# Patient Record
Sex: FEMALE | Race: WHITE | Employment: FULL TIME | URBAN - METROPOLITAN AREA
[De-identification: names, ages, dates, MRNs, and addresses within clinical notes are randomized per-mention and may not be internally consistent; named-entity substitution may affect disease eponyms.]

---

## 2018-12-10 RX ORDER — SERTRALINE HYDROCHLORIDE 50 MG/1
50 TABLET, FILM COATED ORAL
COMMUNITY

## 2018-12-10 NOTE — PERIOP NOTES
[facility] Ambulatory Surgery Unit Pre-operative Instructions Surgery/Procedure Date  12/14/18          Tentative Arrival Time 6402 1. On the day of your surgery/procedure, please report to the Ambulatory Surgery Unit Registration Desk and sign in at your designated time. The Ambulatory Surgery Unit is located in [facility] on the [facility] side of the [facility] across from the [address] [place] [address]. Please have all of your health insurance cards and a photo ID. 2. You must have someone with you to drive you home, as you should not drive a car for 24 hours following anesthesia. Please make arrangements for a responsible adult friend or family member to stay with you for at least the first 24 hours after your surgery. 3. Do not have anything to eat or drink (including water, gum, mints, coffee, juice) after 11:59 PM  12/13/18. This may not apply to medications prescribed by your physician. (Please note below the special instructions with medications to take the morning of surgery, if applicable.) 4. We recommend you do not drink any alcoholic beverages for 24 hours before and after your surgery. 5. Contact your surgeons office for instructions on the following medications: non-steroidal anti-inflammatory drugs (i.e. Advil, Aleve), vitamins, and supplements. (Some surgeons will want you to stop these medications prior to surgery and others may allow you to take them) **If you are currently taking Plavix, Coumadin, Aspirin and/or other blood-thinning agents, contact your surgeon for instructions. ** Your surgeon will partner with the physician prescribing these medications to determine if it is safe to stop or if you need to continue taking. Please do not stop taking these medications without instructions from your surgeon.  
 
6. In an effort to help prevent surgical site infection, we ask that you shower with an anti-bacterial soap (i.e. Dial/Safeguard, or the soap provided to you at your preadmission testing appointment) for 3 days prior to and on the morning of surgery, using a fresh towel after each shower. (Please begin this process with fresh bed linens.) Do not apply any lotions, powders, or deodorants after the shower on the day of your procedure. If applicable, please do not shave the operative site for 48 hours prior to surgery. 7. Wear comfortable clothes. Wear glasses instead of contacts. Do not bring any jewelry or money (other than copays or fees as instructed). Do not wear make-up, particularly mascara, the morning of your surgery. Do not wear nail polish, particularly if you are having foot /hand surgery. Wear your hair loose or down, no ponytails, buns, jama pins or clips. All body piercings must be removed. 8. You should understand that if you do not follow these instructions your surgery may be cancelled. If your physical condition changes (i.e. fever, cold or flu) please contact your surgeon as soon as possible. 9. It is important that you be on time. If a situation occurs where you may be late, or if you have any questions or problems, please call [phone]. 
 
10. Your surgery time may be subject to change. You will receive a phone call the day prior to surgery to confirm your arrival time. 11. Pediatric patients: please bring a change of clothes, diapers, bottle/sippy cup, pacifier, etc. 
 
 
Special Instructions: Take all medications and inhalers, as prescribed, on the morning of surgery with a sip of water EXCEPT: metformin Insulin Dependent Diabetic patients: Take your diabetic medications as prescribed the day before surgery. Hold all diabetic medications the day of surgery. If you are scheduled to arrive for surgery after 8:00 AM, and your AM blood sugar is >200, please call Ambulatory Surgery.  
 
 
I understand a pre-operative phone call will be made to verify my surgery time.  In the event that I am not available, I give permission for a message to be left on my answering service and/or with another person? YES The above note was reviewed with patient during PAT phone call on 12/10/18, patient verbalized understanding.  
 
 
 
 
 ___________________      ___________________      ________________ 
(Signature of Patient)          (Witness)                   (Date and Time)

## 2018-12-12 PROBLEM — N95.0 POSTMENOPAUSAL BLEEDING: Status: ACTIVE | Noted: 2018-12-12

## 2018-12-12 NOTE — H&P
Chief Complaint  Pre-Op Exam  Patient's Care Team  OB/GYN: Cheyenne Oates MD: French Pham Do Ever Diaz [address], [address] , [address] [facility], Ph [phone], Fax [phone] NPI: [id number]  Primary Care Provider: Josh Badillo: [address] [facility], [facility] Shakir Bhakta Rylan, Ph [phone], Fax [phone] NPI: [id number]  Patient's Pharmacies  [facility]Y [id number]): [address] [facility] [address], Ph [phone], Fax [phone]  12/12/2018 12:03 pm  Ht: 5 ft 5 in (165.1 cm)  Wt: Refused  BP: 146/90     Allergies  Reviewed Allergies  CIPRO: Hives (Severe) - Reaction: hives; Severity: Severe; Comment: Entered By: Michelle Mercado LPNSigned By: Michelle Mercado LPNType: GPICode: [id number]: N;   LATEX: Rash (Moderate) - Reaction: rash;Severity: Moderate; Comment: Entered By: Adam Goodwin RNSigned By: Chris Williamson MDType: GPICode: [id number]: Y;   SEPTRA: Hives (Severe) - Reaction: hives; Severity: Severe; Comment: Entered By: Michelle PETERSONigned By: [place]: GPICode: [id number]: N;   SULFA (SULFONAMIDE ANTIBIOTICS): Hives     Medications  Reviewed Medications  calcium  11/12/18   entered Jeff Opal Barley  Cytomel 5 mcg tablet  Prescribed by dale 603/164 Syeda Godoy MD  Internal Note: Entered By: Michelle PETERSONigned By: Michelle Mercado LPNUncoded: NBMN: N, start 05/21/2015  05/21/15   filled rshahulhameed. [address]  esomeprazole magnesium 40 mg capsule,delayed release  10/03/18   filled [facility]  fluconazole 150 mg tablet  10/14/18   filled [facility]  fluticasone 50 mcg/actuation nasal spray,suspension  07/13/18   filled [facility]  folic acid  34/82/32   entered Jeff Mendes  levothyroxine 150 mcg tablet  10/19/18   filled [facility]  liothyronine 25 mcg tablet  09/23/18   filled [facility]  lutein  11/12/18   entered Jeff Mendes  Macrodantin 50 mg capsule  Take 1 po q day - take on even numbered months  Internal Note: Entered By: Adam Goodwin RNSigned By: Katey Arenas RNPharmacy Name: [place] [address] Shakir Bhakta Rylan Ph: [phone] Fax: [phone] Date: 74-Cfn-2267Ew ID: [id number] By: Gayatri Hoyos MDUncoded: NBMN: N, start 02/17/2016 02/17/16   filled rshahulhameed. [address]97  magnesium  11/12/18   entered Cindy Simmonds Barley  metFORMIN 500 mg tablet  10/03/18   filled [facility]  methenamine hippurate 1 gram tablet  12/04/17   filled [facility]  metroNIDAZOLE 0.75 % vaginal gel  03/03/18   filled [facility]  montelukast 10 mg tablet  10/03/18   filled [facility]  MoviPrep 100 gram-7.5 gram-2.691 gram oral powder packet  10/16/18   filled [facility]  Myrbetriq 50 mg tablet,extended release  Take 1 every day  Internal Note: Entered By: Gayatri Hoyos MDSigned By: [address] [place] [address] Name: [place] [address] Shakir Bhakta Rylan Ph: [phone] Fax: [phone] Date: 09-Tbh-2560Xd ID: [id number] By: Gayatri Hoyos MDUncoded: NBMN: N, start 09/17/2015  09/17/15   filled rshahulhameed. [address]87[address]  nitrofurantoin monohydrate/macrocrystals 100 mg capsule  10/14/18   filled [facility]  Pravachol 20 mg tablet  Prescribed by non [id number] Syeda Godoy MD  Internal Note: Entered By: Ivy Molina LPNSigned By: Ivy Molina LPNUncoded: NBMN: N, start 05/21/2015  05/21/15   filled rshahulhameed. [address]294  pravastatin 40 mg tablet  10/03/18   filled [facility]  Premarin 0.625 mg/gram vaginal cream  07/19/18   filled [facility]  sertraline 50 mg tablet  Prescribed by non 607/958 Syeda Godoy MD  10/03/18   filled [facility]  traZODone 50 mg tablet  03/19/18   filled [facility]  trimethoprim 100 mg tablet  Take 1 po q day on odd numbered months.   Internal Note: Entered By: Katey Arenas RNSigned By: Katey Arenas RNPharmacy Name: [address] Yony Fagan Ph: [phone] Fax: [phone] Date: [id number]-Lwf-8582Zy ID: [id number] By: Maria Isabel Walker MDUncoded: NBMN: N, start 02/17/2016 02/17/16   filled rshastan. [address]7022  Vitamin B12  11/12/18   entered Mia Mendes    Problems  Reviewed Problems  Atypical squamous cells of undetermined significance on cervical Papanicolaou smear - Onset: 11/14/2018 - Neg HPV repeat pap 2021  History of urinary tract infection - Onset: 02/11/2016 - Entered By: Dimitri Grande By: Maria Isabel Walker MD Description: Personal Hx UTI code: V13.02  Bladder muscle dysfunction - overactive - Onset: 05/21/2015 - Entered By: Maria Isabel Walker MDSigned By: Maria Isabel Walker MD Description: Overactive Bladder code: 854.40  History of urinary disease - Onset: 09/17/2015 - Entered By: Maria Isabel Walker MDSigned By: Maria Isabel Walker MD Description: Personal Hx stress incontinence code: V13.09  Family history of malignant neoplasm of breast - Onset: 05/21/2015 - Entered By: Ernestine Stafford LPNSigned By: Ernestine Stafford LPN Description: Family History of Breast Cancer code: V16.3  Urge incontinence of urine - Onset: 05/21/2015 - Entered By: Maria Isabel Walker MDSigned By: Maria Isabel Walker MD Description: Incontinence-Urge code: 788.31    Hysteroscopy, D&C, Poss Myosure Resection [id number] 10:25am Parksingel 45, PAT per anesthesia    Family History  Reviewed Family History  Mother - Malignant tumor of breast    - Hypertensive disorder  Father - Heart disease  Sister - Malignant tumor of breast  Maternal Grandmother - Malignant tumor of breast  [facility] En[facility] Bernardo.): Has Family History of Heart DiseaseMother (Monae Ramsay.): Has Family History of Breast Cancer, Has Family History of HypertensionSister (full): Has Family History of Breast Cancer    Social History  Reviewed Social History    OB/GYN Social History    Smoking Status: Former smoker (Notes: Quit 40 years ago)  Marital status: Single  Number of children: 0  Are you working: Yes  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 Date of HPV testin2018 (Notes: negative). Abnormal Pap: N. Sexually Active?: N.  STIs/STDs: N.  Current Birth Control Method: None. Endometriosis: N. Fibroids: N. Infertility: N. Ovarian Cyst: Y. PCOS: N.  Obstetric History  Reviewed Obstetric History  TOTAL FULL PRE AB. I AB. S ECTOPICS MULTIPLE LIVING  0           Past Medical History  Reviewed Past Medical History  Gastroesophageal Reflux Disease (GERD): Y  Notes: Bladder Infections Diabetes Hepatitis B Weight Disorder, Hyperlipidemia, chronic UTI, Autoimmune Thyroiditis - Hypothyroid  HPI  59year old with episode of PMB    Seen in [address] [place] [address] noted for nabothian cyst of cervix and [id number] area in fundal region c/w polyp    Cervical stenosis precluded endometrial biopsy    Now admitted for hysteroscopy D&C possible myosure resection. ROS  ROS as noted in the HPI  Physical Exam  Patient is a 22-year-old female. Constitutional: General Appearance: well developed and nourished and pleasant. Level of Distress: no acute distress. Ambulation: ambulating normally. Head: Head: normocephalic. Eyes: Extraocular Movements extraocular movements intact. Ears, Nose, Mouth, Throat: Ears normal hearing. Nose: no external nose lesions.     Neck: Appearance supple, trachea midline, and no neck masses. Thyroid: no enlargement or nodules and non-tender. Lungs / Chest: Respiratory effort: unlabored. Inspection / Palpation: no deformity, tenderness, or swelling. Auscultation: no wheezing or rales/crackles. Cardiovascular: Rate And Rhythm: regular. Heart Sounds: no murmurs, rub, or gallops. Extremities: no clubbing, cyanosis, or edema. Breast: Breasts no masses, tenderness, skin changes, abnormal secretions, or axillary lymphadenopathy and symmetric and normal nipple appearance. Right Breast no right breast mass, erythema, or induration. Left Breast no left breast mass, erythema, or induration. Abdomen: Inspection and Palpation: no tenderness, guarding, masses, rebound tenderness, or CVA tenderness and soft and non-distended. Liver: non-tender; no masses. Spleen: no masses. Hernia: none palpable. Female : Chaperone: present. External genitalia: no lesions, rash, or erythema. Vagina: no discharge, mass, or tenderness. Cervix: no discharge or cervical lesion; nabothian cysts noted. Uterus: midline, non-tender, no mass, and not enlarged. Adnexae: no adnexal mass or tenderness. Bladder and Urethra: no urethral discharge or mass. Lymphatics: Inguinal no inguinal lymphadenopathy. Skin: General Skin no rash or suspicious lesions. Neurologic: Gait and Station: normal gait. Sensation: grossly intact. Motor: grossly intact. Mental Status Exam: Orientation oriented to person, place, and time. Assessment / Plan  1. Postmenopausal bleeding -  Cervical stenosis precluded in office biopsy    US possible polyp    Will post for out patient hysteroscopy D&C possible myosure    All questions were answered and she appeared to understand    N95.0: Postmenopausal bleeding    2.  Atypical squamous cells of undetermined significance on cervical Papanicolaou smear -  Negative HPV    R87.610: Atypical squamous cells of undetermined significance on cytologic smear of cervix (ASC-US)  Date of Surgery Update:  Alex Handy was seen and examined. History and physical has been reviewed. The patient has been examined.  There have been no significant clinical changes since the completion of the originally dated History and Physical.    Signed By: Matthew Gomez MD     December 14, 2018 9:11 AM

## 2018-12-13 ENCOUNTER — ANESTHESIA EVENT (OUTPATIENT)
Dept: SURGERY | Age: 58
End: 2018-12-13
Payer: OTHER GOVERNMENT

## 2018-12-14 ENCOUNTER — HOSPITAL ENCOUNTER (OUTPATIENT)
Age: 58
Setting detail: OUTPATIENT SURGERY
Discharge: HOME | End: 2018-12-14
Attending: OBSTETRICS & GYNECOLOGY | Admitting: OBSTETRICS & GYNECOLOGY
Payer: OTHER GOVERNMENT

## 2018-12-14 ENCOUNTER — ANESTHESIA (OUTPATIENT)
Dept: SURGERY | Age: 58
End: 2018-12-14
Payer: OTHER GOVERNMENT

## 2018-12-14 VITALS
SYSTOLIC BLOOD PRESSURE: 141 MMHG | HEART RATE: 69 BPM | BODY MASS INDEX: 37.51 KG/M2 | RESPIRATION RATE: 18 BRPM | HEIGHT: 67 IN | WEIGHT: 239 LBS | DIASTOLIC BLOOD PRESSURE: 80 MMHG | TEMPERATURE: 97.8 F | OXYGEN SATURATION: 100 %

## 2018-12-14 PROCEDURE — 76210000040 HC AMBSU PH I REC FIRST 0.5 HR: Performed by: OBSTETRICS & GYNECOLOGY

## 2018-12-14 PROCEDURE — 77030018836 HC SOL IRR NACL ICUM -A: Performed by: OBSTETRICS & GYNECOLOGY

## 2018-12-14 PROCEDURE — 77030003666 HC NDL SPINAL BD -A: Performed by: OBSTETRICS & GYNECOLOGY

## 2018-12-14 PROCEDURE — 77030033137 HC TBNG OUTFLO AQUILEX ST HOLO -B: Performed by: OBSTETRICS & GYNECOLOGY

## 2018-12-14 PROCEDURE — 74011250636 HC RX REV CODE- 250/636

## 2018-12-14 PROCEDURE — 88305 TISSUE EXAM BY PATHOLOGIST: CPT

## 2018-12-14 PROCEDURE — 74011250636 HC RX REV CODE- 250/636: Performed by: ANESTHESIOLOGY

## 2018-12-14 PROCEDURE — 77030033135 HC DEV TISS RMVL HYSTSCP MYOSUR HOLO -G1: Performed by: OBSTETRICS & GYNECOLOGY

## 2018-12-14 PROCEDURE — 77030033136 HC TBNG INFLO AQUILEX ST HOLO -C: Performed by: OBSTETRICS & GYNECOLOGY

## 2018-12-14 PROCEDURE — 76210000057 HC AMBSU PH II REC 1 TO 1.5 HR: Performed by: OBSTETRICS & GYNECOLOGY

## 2018-12-14 PROCEDURE — 74011250636 HC RX REV CODE- 250/636: Performed by: OBSTETRICS & GYNECOLOGY

## 2018-12-14 PROCEDURE — 74011250637 HC RX REV CODE- 250/637: Performed by: ANESTHESIOLOGY

## 2018-12-14 PROCEDURE — 77030020255 HC SOL INJ LR 1000ML BG: Performed by: OBSTETRICS & GYNECOLOGY

## 2018-12-14 PROCEDURE — 77030012961 HC IRR KT CYSTO/TUR ICUM -A: Performed by: OBSTETRICS & GYNECOLOGY

## 2018-12-14 PROCEDURE — 76030000000 HC AMB SURG OR TIME 0.5 TO 1: Performed by: OBSTETRICS & GYNECOLOGY

## 2018-12-14 PROCEDURE — 76060000061 HC AMB SURG ANES 0.5 TO 1 HR: Performed by: OBSTETRICS & GYNECOLOGY

## 2018-12-14 RX ORDER — SODIUM CHLORIDE 0.9 % (FLUSH) 0.9 %
5-10 SYRINGE (ML) INJECTION AS NEEDED
Status: DISCONTINUED | OUTPATIENT
Start: 2018-12-14 | End: 2018-12-14 | Stop reason: HOSPADM

## 2018-12-14 RX ORDER — MORPHINE SULFATE 10 MG/ML
2 INJECTION, SOLUTION INTRAMUSCULAR; INTRAVENOUS
Status: DISCONTINUED | OUTPATIENT
Start: 2018-12-14 | End: 2018-12-14 | Stop reason: HOSPADM

## 2018-12-14 RX ORDER — LIDOCAINE HYDROCHLORIDE 20 MG/ML
INJECTION, SOLUTION EPIDURAL; INFILTRATION; INTRACAUDAL; PERINEURAL AS NEEDED
Status: DISCONTINUED | OUTPATIENT
Start: 2018-12-14 | End: 2018-12-14 | Stop reason: HOSPADM

## 2018-12-14 RX ORDER — SCOLOPAMINE TRANSDERMAL SYSTEM 1 MG/1
1 PATCH, EXTENDED RELEASE TRANSDERMAL ONCE
Status: DISCONTINUED | OUTPATIENT
Start: 2018-12-14 | End: 2018-12-14 | Stop reason: HOSPADM

## 2018-12-14 RX ORDER — LIDOCAINE HYDROCHLORIDE 10 MG/ML
0.1 INJECTION, SOLUTION EPIDURAL; INFILTRATION; INTRACAUDAL; PERINEURAL AS NEEDED
Status: DISCONTINUED | OUTPATIENT
Start: 2018-12-14 | End: 2018-12-14 | Stop reason: HOSPADM

## 2018-12-14 RX ORDER — SODIUM CHLORIDE, SODIUM LACTATE, POTASSIUM CHLORIDE, CALCIUM CHLORIDE 600; 310; 30; 20 MG/100ML; MG/100ML; MG/100ML; MG/100ML
25 INJECTION, SOLUTION INTRAVENOUS CONTINUOUS
Status: DISCONTINUED | OUTPATIENT
Start: 2018-12-14 | End: 2018-12-14 | Stop reason: HOSPADM

## 2018-12-14 RX ORDER — ACETAMINOPHEN 10 MG/ML
INJECTION, SOLUTION INTRAVENOUS
Status: COMPLETED
Start: 2018-12-14 | End: 2018-12-14

## 2018-12-14 RX ORDER — SODIUM CHLORIDE 0.9 % (FLUSH) 0.9 %
5-10 SYRINGE (ML) INJECTION EVERY 8 HOURS
Status: DISCONTINUED | OUTPATIENT
Start: 2018-12-14 | End: 2018-12-14 | Stop reason: HOSPADM

## 2018-12-14 RX ORDER — SODIUM CHLORIDE 9 MG/ML
INJECTION, SOLUTION INTRAVENOUS
Status: COMPLETED | OUTPATIENT
Start: 2018-12-14 | End: 2018-12-14

## 2018-12-14 RX ORDER — OXYCODONE AND ACETAMINOPHEN 5; 325 MG/1; MG/1
1 TABLET ORAL
Status: DISCONTINUED | OUTPATIENT
Start: 2018-12-14 | End: 2018-12-14 | Stop reason: HOSPADM

## 2018-12-14 RX ORDER — FENTANYL CITRATE 50 UG/ML
INJECTION, SOLUTION INTRAMUSCULAR; INTRAVENOUS AS NEEDED
Status: DISCONTINUED | OUTPATIENT
Start: 2018-12-14 | End: 2018-12-14 | Stop reason: HOSPADM

## 2018-12-14 RX ORDER — PROPOFOL 10 MG/ML
INJECTION, EMULSION INTRAVENOUS
Status: DISCONTINUED | OUTPATIENT
Start: 2018-12-14 | End: 2018-12-14 | Stop reason: HOSPADM

## 2018-12-14 RX ORDER — PROPOFOL 10 MG/ML
INJECTION, EMULSION INTRAVENOUS AS NEEDED
Status: DISCONTINUED | OUTPATIENT
Start: 2018-12-14 | End: 2018-12-14 | Stop reason: HOSPADM

## 2018-12-14 RX ORDER — FENTANYL CITRATE 50 UG/ML
25 INJECTION, SOLUTION INTRAMUSCULAR; INTRAVENOUS
Status: DISCONTINUED | OUTPATIENT
Start: 2018-12-14 | End: 2018-12-14 | Stop reason: HOSPADM

## 2018-12-14 RX ORDER — ACETAMINOPHEN 10 MG/ML
1000 INJECTION, SOLUTION INTRAVENOUS ONCE
Status: COMPLETED | OUTPATIENT
Start: 2018-12-14 | End: 2018-12-14

## 2018-12-14 RX ORDER — LIDOCAINE HYDROCHLORIDE 10 MG/ML
INJECTION INFILTRATION; PERINEURAL AS NEEDED
Status: DISCONTINUED | OUTPATIENT
Start: 2018-12-14 | End: 2018-12-14 | Stop reason: HOSPADM

## 2018-12-14 RX ORDER — KETOROLAC TROMETHAMINE 30 MG/ML
INJECTION, SOLUTION INTRAMUSCULAR; INTRAVENOUS AS NEEDED
Status: DISCONTINUED | OUTPATIENT
Start: 2018-12-14 | End: 2018-12-14 | Stop reason: HOSPADM

## 2018-12-14 RX ORDER — DEXAMETHASONE SODIUM PHOSPHATE 4 MG/ML
INJECTION, SOLUTION INTRA-ARTICULAR; INTRALESIONAL; INTRAMUSCULAR; INTRAVENOUS; SOFT TISSUE AS NEEDED
Status: DISCONTINUED | OUTPATIENT
Start: 2018-12-14 | End: 2018-12-14 | Stop reason: HOSPADM

## 2018-12-14 RX ORDER — ONDANSETRON 2 MG/ML
4 INJECTION INTRAMUSCULAR; INTRAVENOUS AS NEEDED
Status: DISCONTINUED | OUTPATIENT
Start: 2018-12-14 | End: 2018-12-14 | Stop reason: HOSPADM

## 2018-12-14 RX ORDER — MIDAZOLAM HYDROCHLORIDE 1 MG/ML
INJECTION, SOLUTION INTRAMUSCULAR; INTRAVENOUS AS NEEDED
Status: DISCONTINUED | OUTPATIENT
Start: 2018-12-14 | End: 2018-12-14 | Stop reason: HOSPADM

## 2018-12-14 RX ORDER — ONDANSETRON 2 MG/ML
INJECTION INTRAMUSCULAR; INTRAVENOUS AS NEEDED
Status: DISCONTINUED | OUTPATIENT
Start: 2018-12-14 | End: 2018-12-14 | Stop reason: HOSPADM

## 2018-12-14 RX ORDER — DIPHENHYDRAMINE HYDROCHLORIDE 50 MG/ML
12.5 INJECTION, SOLUTION INTRAMUSCULAR; INTRAVENOUS AS NEEDED
Status: DISCONTINUED | OUTPATIENT
Start: 2018-12-14 | End: 2018-12-14 | Stop reason: HOSPADM

## 2018-12-14 RX ORDER — HYDROMORPHONE HYDROCHLORIDE 1 MG/ML
.2-.5 INJECTION, SOLUTION INTRAMUSCULAR; INTRAVENOUS; SUBCUTANEOUS ONCE
Status: DISCONTINUED | OUTPATIENT
Start: 2018-12-14 | End: 2018-12-14 | Stop reason: HOSPADM

## 2018-12-14 RX ADMIN — SODIUM CHLORIDE, SODIUM LACTATE, POTASSIUM CHLORIDE, AND CALCIUM CHLORIDE 25 ML/HR: 600; 310; 30; 20 INJECTION, SOLUTION INTRAVENOUS at 09:02

## 2018-12-14 RX ADMIN — ONDANSETRON 4 MG: 2 INJECTION INTRAMUSCULAR; INTRAVENOUS at 09:56

## 2018-12-14 RX ADMIN — DEXAMETHASONE SODIUM PHOSPHATE 4 MG: 4 INJECTION, SOLUTION INTRA-ARTICULAR; INTRALESIONAL; INTRAMUSCULAR; INTRAVENOUS; SOFT TISSUE at 09:56

## 2018-12-14 RX ADMIN — ACETAMINOPHEN 1000 MG: 10 INJECTION, SOLUTION INTRAVENOUS at 10:42

## 2018-12-14 RX ADMIN — PROPOFOL 20 MG: 10 INJECTION, EMULSION INTRAVENOUS at 10:01

## 2018-12-14 RX ADMIN — KETOROLAC TROMETHAMINE 30 MG: 30 INJECTION, SOLUTION INTRAMUSCULAR; INTRAVENOUS at 09:56

## 2018-12-14 RX ADMIN — PROPOFOL 20 MG: 10 INJECTION, EMULSION INTRAVENOUS at 09:55

## 2018-12-14 RX ADMIN — FENTANYL CITRATE 50 MCG: 50 INJECTION, SOLUTION INTRAMUSCULAR; INTRAVENOUS at 09:50

## 2018-12-14 RX ADMIN — PROPOFOL 80 MG: 10 INJECTION, EMULSION INTRAVENOUS at 09:50

## 2018-12-14 RX ADMIN — MIDAZOLAM HYDROCHLORIDE 2 MG: 1 INJECTION, SOLUTION INTRAMUSCULAR; INTRAVENOUS at 09:42

## 2018-12-14 RX ADMIN — PROPOFOL 20 MG: 10 INJECTION, EMULSION INTRAVENOUS at 10:09

## 2018-12-14 RX ADMIN — LIDOCAINE HYDROCHLORIDE 50 MG: 20 INJECTION, SOLUTION EPIDURAL; INFILTRATION; INTRACAUDAL; PERINEURAL at 09:50

## 2018-12-14 RX ADMIN — PROPOFOL 75 MCG/KG/MIN: 10 INJECTION, EMULSION INTRAVENOUS at 09:50

## 2018-12-14 NOTE — BRIEF OP NOTE
BRIEF OPERATIVE NOTE    Date of Procedure: 12/14/2018   Preoperative Diagnosis: POST MENOPAUSAL BLEEDING  Postoperative Diagnosis: Endometrial polyp    Procedure(s):   HYSTEROSCOPY DLATATION AND CURETTAGE,  MYOSURE RESECTION  Surgeon(s) and Role:     * Nan Combs MD - Primary         Surgical Assistant: None    Surgical Staff:  Circ-1: Janey Hinton  Scrub Tech-1: Kilpatrick LiveClips  Event Time In Time Out   Incision Start 1000    Incision Close 1014      Anesthesia: MAC and paracervical block  Estimated Blood Loss: Minimal  Specimens:   ID Type Source Tests Collected by Time Destination   1 : Endometrial curettings Preservative Endometrial Curetting  Nan Combs MD 12/14/2018 1003 Pathology      Findings: 2 cm polyp filling endometrial cavity, no other pathology noted  Complications: None  Fluid deficit 140cc   Implants: * No implants in log *   Dictated:[id number]

## 2018-12-14 NOTE — PERIOP NOTES
Yael Maine  1960  [id number]    Situation:  Verbal report given from: MAMADOU Lyons  Procedure: Procedure(s):   HYSTEROSCOPY DLATATION AND CURETTAGE,  MYOSURE RESECTION    Background:    Preoperative diagnosis: POST MENOPAUSAL BLEEDING    Postoperative diagnosis: Endometrial polyp    :  Dr. Zohreh Jeffrey    Assistant(s): Circ-1: Adiel Doty Tech-1: Gerald Santana    Specimens:   ID Type Source Tests Collected by Time Destination   1 : Endometrial curettings Preservative Endometrial Curetting  David He MD 12/14/2018 1003 Pathology       Assessment:  Intra-procedure medications         Anesthesia gave intra-procedure sedation and medications, see anesthesia flow sheet     Intravenous fluids: LR@ KVO     Vital signs stable       Recommendation:    Permission to share finding with sister : yes

## 2018-12-14 NOTE — ANESTHESIA PREPROCEDURE EVALUATION
Anesthetic History     PONV ( motion sickness)          Review of Systems / Medical History  Patient summary reviewed, nursing notes reviewed and pertinent labs reviewed    Pulmonary  Within defined limits                 Neuro/Psych         Psychiatric history (depression)     Cardiovascular  Within defined limits                Exercise tolerance: >4 METS     GI/Hepatic/Renal     GERD: well controlled  Hepatitis (1980s): type C         Endo/Other      Hypothyroidism: well controlled       Other Findings            Physical Exam    Airway  Mallampati: I  TM Distance: 4 - 6 cm  Neck ROM: normal range of motion   Mouth opening: Normal     Cardiovascular    Rhythm: regular  Rate: normal         Dental  No notable dental hx       Pulmonary  Breath sounds clear to auscultation               Abdominal  GI exam deferred       Other Findings            Anesthetic Plan    ASA: 2  Anesthesia type: MAC and general - backup          Induction: Intravenous  Anesthetic plan and risks discussed with: Patient      Scopolamine patch for h/o motion sickness

## 2018-12-14 NOTE — ANESTHESIA POSTPROCEDURE EVALUATION
Procedure(s): HYSTEROSCOPY DLATATION AND CURETTAGE,  MYOSURE RESECTION.     Anesthesia Post Evaluation      Multimodal analgesia: multimodal analgesia used between 6 hours prior to anesthesia start to PACU discharge  Patient location during evaluation: bedside  Patient participation: complete - patient participated  Level of consciousness: awake and alert  Pain score: 3  Airway patency: patent  Anesthetic complications: no  Cardiovascular status: acceptable  Respiratory status: acceptable  Hydration status: acceptable  Post anesthesia nausea and vomiting:  none      Visit Vitals  /80   Pulse 69   Temp 36.6 °C (97.8 °F)   Resp 18   Ht 5' 7\" (1.702 m)   Wt 108.4 kg (239 lb)   SpO2 100%   BMI 37.43 kg/m²

## 2018-12-14 NOTE — OP NOTES
[address]  OPERATIVE REPORT    Erica Olmedo  MR#: [id number]  : 1960  ACCOUNT #: [de-identified]   DATE OF SERVICE: 2018    PREOPERATIVE DIAGNOSIS:  Postmenopausal bleeding, suspicious for endometrial polyp. POSTOPERATIVE DIAGNOSIS:  Postmenopausal bleeding with endometrial polyp. PROCEDURES PERFORMED:  Hysteroscopy, dilatation and curettage, MyoSure resection of polyp. SURGEON:  Carey Morelos MD    ASSISTANT:  None. ANESTHESIA:  MAC along with paracervical block using 20 mL of 1% lidocaine. ESTIMATED BLOOD LOSS:  Minimal.    SPECIMENS REMOVED:  Included endometrial polyp and scant endometrial curettings. COMPLICATIONS:  None. IMPLANTS:  None. FINDINGS:  At time of hysteroscopy, there was noted to be a moderate amount of cervical stenosis. On entrance into the uterine cavity there was a polyp approximately 2 cm that filled most of the cavity. This polyp was taken down to its base. Once it was removed both tubal ostia were clearly seen. There is no other endocervical or endometrial pathology. DESCRIPTION OF PROCEDURE:  Once all permits were obtained and proper patient identification, the patient was taken to the operating room THE [facility] Amb[facility] Surgery. She underwent MAC anesthesia, prepped and draped in the usual sterile manner for hysteroscopy, D and C. Once all prepping and draping was done a timeout was performed identifying the patient, indication for surgery, no immediate need for antibiotics or beta blockers. Following this, a red rubber catheter was used to drain the bladder of approximately 50 mL of urine. A side opening speculum was placed. A paracervical block was performed and 20 mL of 1% lidocaine. Anterior lip of the cervix was grasped with single tooth tenaculum. A moderate amount of cervical stenosis was encountered. Cervical canal was dilated.   Once it was dilated to the appropriate width the MyoSure hysteroscope was placed. The entrance into the endometrial cavity was noted to be anterior from where the cervical dilatation had occurred. Once we were able to navigate into the cavity the endometrial polyp was seen. Once the polyp was identified the MyoSure system was placed and under direct hysteroscopic evaluation the polyp was resected and sent off the field to pathology. A scant amount of endometrial curettings were then obtained at the end and collected and sent off the field also. At the conclusion of the operation there was no evidence of bleeding from the uterus or single tooth tenaculum site. Estimated fluid deficit for the MyoSure resection was approximately 150 mL. The patient was taken directly from the operating room to the recovery room in stable condition, observed to be stable and discharged home.       MD LILI Peters / Keyshawn Delong  D: 12/14/2018 10:23     T: 12/14/2018 15:29  JOB #: [id number]

## 2018-12-14 NOTE — PERIOP NOTES
1024-Pt received to pacu. Pt is awake, alert, talkative. VSS on room air. Genny pad is dry. 1029-Dr Chirinos at bedside with sister. 1035-HOB elevated and pt is sipoing water. C/o cramping abd pain, 5/10, doesn't want narcotics. 1042-After consulting with Dr Marly Wing, admin Ofimerv 100mg IV. 1050-Pt resting quietly, and states cramping is easing up. Discharge instructions reviewed. They voice no questions or concerns. 1115-Pt is awake and alert. VSS. Pt states abd cramping is tolerable. Patient meets discharge criteria and agrees she is ready to go home, sister  also agrees. PIV removed. Pt wanted to dress self. Glasses on & hearing aids in.  1136-Pt discharged home in stable condition via w/c to car. Instructed to get up with assistance today.

## 2018-12-14 NOTE — DISCHARGE INSTRUCTIONS
After Care Instructions For Your D&C      1. You may resume your usual diet once the nausea resolves. Initially, try sips of warm fluids and a bland diet. 2. Avoid heavy lifting and straining. Gradually increase your activity. First, try walking and doing light activity around the house. Resume your normal habits if no significant discomfort or bleeding develops. Most women can return to work within one to four days after this procedure. 3. You may take showers. Avoid using a tub bath, swimming pool or hot tub until after your check-up. 4. Do not place anything in your vagina until after your postoperative visit. Do not   douche, use tampons, or have intercourse because this may cause bleeding and   infection. 5. You may initially experience a heavy bloody discharge. This should not be more than your menstrual flow. Over the next several days, the flow should steadily decrease. 6. Typically following the procedure, there is little or no pain. You may feel cramps in your lower abdomen. Tylenol may relieve mild cramping. If pain medication does not improve your symptoms, you should contact your physician. 7. Contact the office if you have excessive bleeding (saturating a pad an hour for two hours or passing large clots). It is also necessary to speak with your physician if you develop chills, a temperature greater than 100.4, difficulty voiding or burning on urination. 8. Your physician may want to see you in the office after your D&C. Please call for an appointment if this has not already been arranged. Our office phone number is [phone]. If appropriate, the microscopic results from your procedure will be discussed at this follow-up visit.            >>>You received Toradol during your surgery.  You may not take any form of NSAIDS (non steroidal anti inflammatory drugs) such as Advil, Ibuprofen, Aleve, Motrin until 4:00pm.<<<      >>>You received an IV form of Tylenol 1000mg during your surgery, you may take tylenol (or pain medication containing Tylenol or Acetaminophen) in 6 hours at 4:445pm.<<<            DO NOT TAKE SLEEPING MEDICATIONS OR ANTIANXIETY MEDICATIONS WHILE TAKING NARCOTIC PAIN MEDICATIONS,  ESPECIALLY THE NIGHT OF ANESTHESIA. CPAP PATIENTS BE SURE TO WEAR MACHINE WHENEVER NAPPING OR SLEEPING. SCOPOLAMINE TRANSDERMAL PATCH      Scopolamine (Absorbed through the skin)  Scopolamine ([id number])    Prevents nausea and vomiting caused by motion sickness or anesthesia and surgery in adults. Remove patch in 24 hours. Brand Name(s):Transderm Scop  There may be other brand names for this medicine. When This Medicine Should Not Be Used: You should not use this medicine if you have had an allergic reaction to scopolamine, or if you have narrow angle glaucoma. After you take off the patch, wash the place where the patch was and your hands thoroughly. How to Dispose of This Medicine: Fold the used patch in half with the sticky sides together. Throw any used patch away so that children or pets cannot get to it. Keep all medicine away from children and never share your medicine with anyone. Drugs and Foods to Avoid:  Ask your doctor or pharmacist before using any other medicine, including over-the-counter medicines, vitamins, and herbal products. Tell your doctor if you are using any medicines that make you sleepy. These include sleeping pills, cold and allergy medicine, narcotic pain relievers, and sedatives. Do not drink alcohol while you are using this medicine. Warnings While Using This Medicine:  Make sure your doctor knows if you are pregnant or breastfeeding, or if you have glaucoma, prostate problems, trouble urinating, blocked bowels, liver disease, kidney disease, or a history of seizures or mental illness. This medicine can cause blurring of vision and other vision problems if it comes in contact with the eyes.  This medicine may also cause problems with urination. If any of these reactions occur, remove the patch and call your doctor right away. This medicine may make you dizzy or drowsy. Avoid driving, using machines, or doing anything else that could be dangerous if you are not alert. If you plan to participate in underwater sports, this medicine may cause disorienting effects. If this is a concern for you, talk with your doctor. Possible Side Effects While Using This Medicine:  Call your doctor right away if you notice any of these side effects: Allergic reaction: Itching or hives, swelling in your face or hands, swelling or tingling in your mouth or throat, chest tightness, trouble breathing. Blurred vision. Confusion or memory loss. Fast, slow, or uneven heartbeat. Lightheadedness, dizziness, drowsiness, or fainting. Seeing, hearing, or feeling things that are not there. Severe eye pain. Trouble urinating. If you notice these less serious side effects, talk with your doctor:  Dry mouth. Dry, itchy, or red eyes. Restlessness. Skin rash or redness. If you notice other side effects that you think are caused by this medicine, tell your doctor. Call your doctor for medical advice about side effects. You may report side effects to FDA at 1-800-FDA-1088  © 3422-5947 [employer] All rights reserved. Scopolamine (Transdermal) (Patch, Extended Release) - DrugNote, English  Generated on Thursday, September 15, 2011 1:38:05 PM     DISCHARGE SUMMARY from Nurse    The following personal items collected during your admission are returned to you:   Dental Appliance: Dental Appliances: None  Vision: Visual Aid: Glasses(sister) PT HAS  Hearing Aid:    Jewelry: Jewelry: None  Clothing: Clothing: With patient  Other Valuables:  Other Valuables: Eyeglasses(hearing aids, eyeglasses with sister) PT HAS  Valuables sent to safe:        PATIENT INSTRUCTIONS:    After General Anesthesia or Intravenous Sedation, for 24 hours or while taking prescription Narcotics:        Someone should be with you for the next 24 hours. For your own safety, a responsible adult must drive you home. · Limit your activities  · Recommended activity: Rest today, up with assistance today. Do not climb stairs or shower unattended for the next 24 hours. · Please start with a soft bland diet and advance as tolerated (no nausea) to regular diet. · If you have a sore throat you should try the following: fluids, warm salt water gargles, or throat lozenges. If it does not improve after several days please follow up with your primary physician. · Do not drive and operate hazardous machinery  · Do not make important personal or business decisions  · Do  not drink alcoholic beverages  · If you have not urinated within 8 hours after discharge, please contact your surgeon on call. Report the following to your surgeon:  · Excessive pain, swelling, redness or odor of or around the surgical area  · Temperature over 100.5  · Nausea and vomiting lasting longer than 4 hours or if unable to take medications  · Any signs of decreased circulation or nerve impairment to extremity: change in color, persistent  numbness, tingling, coldness or increase pain      · You will receive a Post Operative Call from one of the Recovery Room Nurses on the day after your surgery to check on you. It is very important for us to know how you are recovering after your surgery. If you have an issue or need to speak with someone, please call your surgeon, do not wait for the post operative call. · You may receive an e-mail or letter in the mail from [facility] regarding your experience with us in the Ambulatory Surgery Unit. Your feedback is valuable to us and we appreciate your participation in the survey. · If the above instructions are not adequate, please contact Randolph Lorenzo RN, [facility] anesthesia Nurse Manager or our Anesthesiologist, at [phone].   If you are having problems after your surgery, call the physician at his office number. · We wish you a speedy recovery ? What to do at Home:      *  Please give a list of your current medications to your Primary Care Provider. *  Please update this list whenever your medications are discontinued, doses are      changed, or new medications (including over-the-counter products) are added. *  Please carry medication information at all times in case of emergency situations. These are general instructions for a healthy lifestyle:    No smoking/ No tobacco products/ Avoid exposure to second hand smoke    Surgeon General's Warning:  Quitting smoking now greatly reduces serious risk to your health. Obesity, smoking, and sedentary lifestyle greatly increases your risk for illness    A healthy diet, regular physical exercise & weight monitoring are important for maintaining a healthy lifestyle    You may be retaining fluid if you have a history of heart failure or if you experience any of the following symptoms:  Weight gain of 3 pounds or more overnight or 5 pounds in a week, increased swelling in our hands or feet or shortness of breath while lying flat in bed. Please call your doctor as soon as you notice any of these symptoms; do not wait until your next office visit. Recognize signs and symptoms of STROKE:    B - Balance  E - Eyes    F-  Face looks uneven    A-  Arms unable to move or move even    S-  Speech slurred or non-existent    T-  Time-call 911 as soon as signs and symptoms begin-DO NOT go       Back to bed or wait to see if you get better-TIME IS BRAIN. If you have not received your influenza and/or pneumococcal vaccine, please follow up with your primary care physician. The discharge information has been reviewed with the {PATIENT PARENT GUARDIAN:[id number]}. The {PATIENT PARENT GUARDIAN:45250} verbalized understanding.

## 2018-12-14 NOTE — PERIOP NOTES
Permission received to review discharge instructions and discuss private health information with Chan Meeks, sister.

## 2020-05-01 PROBLEM — N32.81 OVERACTIVE BLADDER: Status: ACTIVE | Noted: 2020-05-01

## 2021-09-23 PROBLEM — R87.610 ATYPICAL SQUAMOUS CELLS OF UNDETERMINED SIGNIFICANCE ON CYTOLOGIC SMEAR OF CERVIX (ASC-US): Status: ACTIVE | Noted: 2018-11-14

## (undated) DEVICE — INFECTION CONTROL KIT SYS

## (undated) DEVICE — GOWN,SIRUS,FABRNF,XL,20/CS: Brand: MEDLINE

## (undated) DEVICE — TUBE ST FLD AQUILEX OUTFLO --

## (undated) DEVICE — SPECIMEN SOCK - STANDARD: Brand: MEDI-VAC

## (undated) DEVICE — D&C/GYN-LF: Brand: MEDLINE INDUSTRIES, INC.

## (undated) DEVICE — SOLUTION IRRIG 3000ML 0.9% SOD CHL FLX CONT 0797208] ICU MEDICAL INC]

## (undated) DEVICE — SOLUTION LACTATED RINGERS INJECTION USP

## (undated) DEVICE — 3M™ TEGADERM™ TRANSPARENT FILM DRESSING FRAME STYLE, 1624W, 2-3/8 IN X 2-3/4 IN (6 CM X 7 CM), 100/CT 4CT/CASE: Brand: 3M™ TEGADERM™

## (undated) DEVICE — (D)DEVICE TISS REMOVAL -- SOLD AS BX/3 USE ITEM 335978

## (undated) DEVICE — LIGHT HANDLE: Brand: DEVON

## (undated) DEVICE — CONTINU-FLO SOLUTION SET, 2 INJECTION SITES, MALE LUER LOCK ADAPTER WITH RETRACTABLE COLLAR, LARGE BORE STOPCOCK WITH ROTATING MALE LUER LOCK EXTENSION SET, 2 INJECTION SITES, MALE LUER LOCK ADAPTER WITH RETRACTABLE COLLAR: Brand: INTERLINK/CONTINU-FLO

## (undated) DEVICE — KENDALL DL ECG CABLE AND LEAD WIRE SYSTEM, 5-LEAD, SINGLE PATIENT USE: Brand: KENDALL

## (undated) DEVICE — SYR 10ML LUER LOK 1/5ML GRAD --

## (undated) DEVICE — TUBE ST FLD CTRL AQUILEX INFLO --

## (undated) DEVICE — SET SEALS HYSTEROSCOPE DISP -- MYOSURE  EA=10

## (undated) DEVICE — NEEDLE SPNL 22GA L3.5IN BLK HUB S STL REG WALL FIT STYL W/

## (undated) DEVICE — SET 2ND L34IN N DEHP THE QUEENS MED CNTR VALUELINK

## (undated) DEVICE — STERILE POLYISOPRENE POWDER-FREE SURGICAL GLOVES: Brand: PROTEXIS

## (undated) DEVICE — MEDI-VAC NON-CONDUCTIVE SUCTION TUBING: Brand: CARDINAL HEALTH

## (undated) DEVICE — PREP PAD BNS: Brand: CONVERTORS

## (undated) DEVICE — TOWEL SURG W17XL27IN STD BLU COT NONFENESTRATED PREWASHED

## (undated) DEVICE — TUBING IRRIG L77IN DIA0.241IN L BOR FOR CYSTO W/ NVENT

## (undated) DEVICE — SOL IRRIGATION INJ NACL 0.9% 500ML BTL